# Patient Record
Sex: FEMALE | Employment: PART TIME | URBAN - METROPOLITAN AREA
[De-identification: names, ages, dates, MRNs, and addresses within clinical notes are randomized per-mention and may not be internally consistent; named-entity substitution may affect disease eponyms.]

---

## 2023-07-19 ENCOUNTER — TRANSFERRED RECORDS (OUTPATIENT)
Dept: HEALTH INFORMATION MANAGEMENT | Facility: CLINIC | Age: 58
End: 2023-07-19
Payer: COMMERCIAL

## 2023-07-19 ENCOUNTER — MEDICAL CORRESPONDENCE (OUTPATIENT)
Dept: HEALTH INFORMATION MANAGEMENT | Facility: CLINIC | Age: 58
End: 2023-07-19
Payer: COMMERCIAL

## 2023-07-21 ENCOUNTER — TRANSCRIBE ORDERS (OUTPATIENT)
Dept: OTHER | Age: 58
End: 2023-07-21

## 2023-07-21 DIAGNOSIS — H66.91 RIGHT OTITIS MEDIA, UNSPECIFIED OTITIS MEDIA TYPE: ICD-10-CM

## 2023-07-21 DIAGNOSIS — G51.0 FACIAL PARALYSIS ON RIGHT SIDE: Primary | ICD-10-CM

## 2023-07-26 ENCOUNTER — TRANSCRIBE ORDERS (OUTPATIENT)
Dept: OTHER | Age: 58
End: 2023-07-26

## 2023-07-26 DIAGNOSIS — H90.71 MIXED CONDUCTIVE AND SENSORINEURAL HEARING LOSS OF RIGHT EAR, UNSPECIFIED HEARING STATUS ON CONTRALATERAL SIDE: ICD-10-CM

## 2023-07-26 DIAGNOSIS — G51.0 FACIAL PARALYSIS ON RIGHT SIDE: Primary | ICD-10-CM

## 2023-07-26 DIAGNOSIS — H66.91 RIGHT OTITIS MEDIA, UNSPECIFIED OTITIS MEDIA TYPE: ICD-10-CM

## 2023-08-02 NOTE — TELEPHONE ENCOUNTER
FUTURE VISIT INFORMATION:      FUTURE VISIT INFORMATION:  Date:   9/21/23  Time:   1:30 PM  Location: [facility]  REFERRAL INFORMATION:  Referring provider:   Referring providers clinic:   Reason for visit/diagnosis:  per patient facial paralysis/ bell palsy onset 15 days. mastoiditis referral from [facility]. confirmed [facility]     RECORDS REQUESTED FROM:       Clinic name Comments Records Status Imaging Status   [facility]  7/19/23 ent note- Ken Goldstein MD  7/19/23 audio note- Roly Krause MS      Audiogram:   7/19/23 -pending req/ RECEIVED send to scan on 8/4 [facility]    Imaging  MRN: [id number]   Fx: [phone] CT temporal 7/18/23  CT head 7/15/23 [facility] Pending req  Req 8/4/23  PACS                  August 4, 2023 at 8:47 AM - request for images from [facility] pushed to [facility] and audiogram -Isabelle  August 4, 2023 at 1:53 PM - images resolved in pacs. Audiogram send to scan. Copy in Dr Leone's rightfax -Isabelle

## 2023-08-21 ENCOUNTER — TELEPHONE (OUTPATIENT)
Dept: OTOLARYNGOLOGY | Facility: CLINIC | Age: 58
End: 2023-08-21
Payer: COMMERCIAL

## 2023-08-21 NOTE — TELEPHONE ENCOUNTER
Pt said her bells palsy/facial movement resolved and is better. She is still having issues with hearing loss and swelling behind the ear. No further questions. If there is anything Dr Leone wants more, we will reach out.     Lona Vivar LPN

## 2023-08-21 NOTE — TELEPHONE ENCOUNTER
"\"I would recommend going back to see Dr. Goldstein before her appointment with me since he knows what her ear has been looking like.\" Per Dr Leone    Let pt know and she will get in to see Dr Goldstein.    Lona Vivar LPN    "

## 2023-08-21 NOTE — TELEPHONE ENCOUNTER
"\"Would you call and find out if her facial movement is getting better? If not, I'd like her to get a MRI with and without as well as a bone scan prior to her visit. \" Per Dr Vasu Vivar, [facility]    "

## 2023-10-23 ENCOUNTER — PRE VISIT (OUTPATIENT)
Dept: OTOLARYNGOLOGY | Facility: CLINIC | Age: 58
End: 2023-10-23